# Patient Record
Sex: FEMALE | Race: ASIAN | NOT HISPANIC OR LATINO
[De-identification: names, ages, dates, MRNs, and addresses within clinical notes are randomized per-mention and may not be internally consistent; named-entity substitution may affect disease eponyms.]

---

## 2024-04-20 PROBLEM — Z00.00 ENCOUNTER FOR PREVENTIVE HEALTH EXAMINATION: Status: ACTIVE | Noted: 2024-04-20

## 2024-05-01 ENCOUNTER — APPOINTMENT (OUTPATIENT)
Dept: ORTHOPEDIC SURGERY | Facility: CLINIC | Age: 79
End: 2024-05-01
Payer: SELF-PAY

## 2024-05-01 VITALS — WEIGHT: 110 LBS | HEIGHT: 59 IN | BODY MASS INDEX: 22.18 KG/M2

## 2024-05-01 DIAGNOSIS — M50.90 CERVICAL DISC DISORDER, UNSPECIFIED, UNSPECIFIED CERVICAL REGION: ICD-10-CM

## 2024-05-01 DIAGNOSIS — M62.838 OTHER MUSCLE SPASM: ICD-10-CM

## 2024-05-01 DIAGNOSIS — M54.12 RADICULOPATHY, CERVICAL REGION: ICD-10-CM

## 2024-05-01 DIAGNOSIS — Z98.890 OTHER SPECIFIED POSTPROCEDURAL STATES: ICD-10-CM

## 2024-05-01 PROCEDURE — 73030 X-RAY EXAM OF SHOULDER: CPT | Mod: LT

## 2024-05-01 PROCEDURE — 99203 OFFICE O/P NEW LOW 30 MIN: CPT

## 2024-05-01 PROCEDURE — 73010 X-RAY EXAM OF SHOULDER BLADE: CPT | Mod: LT

## 2024-05-01 RX ORDER — CELECOXIB 50 MG/1
CAPSULE ORAL
Refills: 0 | Status: ACTIVE | COMMUNITY

## 2024-05-01 RX ORDER — METHYLPREDNISOLONE 4 MG/1
4 TABLET ORAL
Qty: 1 | Refills: 0 | Status: ACTIVE | COMMUNITY
Start: 2024-05-01 | End: 1900-01-01

## 2024-05-01 NOTE — ASSESSMENT
[FreeTextEntry1] : Atraumatic neck and trapezial pain with occasional radiation down the left arm for 2 months.  Prior rotator cuff repair and distal clavicle resection in [place] many years ago and did well.  Shoulder does not seem to be involved on exam today.  Medrol Dosepak and course of physical therapy advised for the neck.  She will see the spine team if still symptomatic in a month   The patient's current medication management of their orthopedic diagnosis was reviewed today:(1) We discussed a comprehensive treatment plan that included pharmaceutical management involving the use of prescription medications. (2) There is a moderate risk of morbidity with further treatment, especially from use of prescription strength medications and possible side effects of these medications which include upset stomach with oral medications, skin reactions to topical medications and cardiac/renal/diabetes issues with long term use. (3) I recommended that the patient follow-up with their medical physician to discuss any significant specific potential issues with long term medication use such as interactions with current medications or with exacerbation of underlying medical comorbidities. (4) The benefits and risks associated with use of injectable, oral or topical, prescription and over the counter anti-inflammatory medications were discussed with the patient. The patient voiced understanding of the risks including but not limited to bleeding, stroke, kidney dysfunction, heart disease, and were referred to the black box warning label for further information.

## 2024-05-01 NOTE — IMAGING
[de-identified] : Cervical Spine:  No swelling, no ecchymosis Trapezial and paracervical tenderness to palpation Diminished range of motion in all planes 5/5 deltoid, biceps, triceps and wrist flexors/extensors Negative spurling, negative bruno Reflexes +2, intact motor distally NVID  No swelling, no ecchymosis, no deformity, no scapular winging. No tenderness to palpation over shoulder, AC joint or trapezius. Forward flexion: Active 180 degrees; Passive 180 degrees Abduction: Active 180 degrees; Passive 180 degrees External rotation (with shoulder abducted): Active 90 degrees , Passive 90 degrees Internal rotation (with shoulder abducted): Active 70 degrees, Passive 70 degrees Extension: Active 60 degrees; passive 60 degrees Adduction: Active 30 degrees; passive 30 degrees 5/5 supraspinatus, 5/5 infraspinatus and 5/5 subscapularis. Negative Sanderson test, negative impingement sign, negative Moses test. Speeds and Yergason negative Motor and sensory intact distally [Left] : left shoulder [There are no fractures, subluxations or dislocations. No significant abnormalities are seen] : There are no fractures, subluxations or dislocations. No significant abnormalities are seen [Degenerative change] : Degenerative change [FreeTextEntry1] : Metal anchor in place along the greater tuberosity.  Status post distal clavicle resection mild degenerative changes the glenohumeral joint